# Patient Record
Sex: FEMALE | Race: WHITE | URBAN - METROPOLITAN AREA
[De-identification: names, ages, dates, MRNs, and addresses within clinical notes are randomized per-mention and may not be internally consistent; named-entity substitution may affect disease eponyms.]

---

## 2017-02-20 ENCOUNTER — HISTORICAL (OUTPATIENT)
Dept: ANESTHESIOLOGY | Facility: HOSPITAL | Age: 37
End: 2017-02-20

## 2017-02-24 ENCOUNTER — HISTORICAL (OUTPATIENT)
Dept: SURGERY | Facility: HOSPITAL | Age: 37
End: 2017-02-24

## 2025-06-02 DIAGNOSIS — M25.462 KNEE EFFUSION, LEFT: Primary | ICD-10-CM

## 2025-06-16 ENCOUNTER — OFFICE VISIT (OUTPATIENT)
Dept: ORTHOPEDICS | Facility: CLINIC | Age: 45
End: 2025-06-16
Payer: COMMERCIAL

## 2025-06-16 VITALS
HEIGHT: 68 IN | WEIGHT: 160.38 LBS | BODY MASS INDEX: 24.31 KG/M2 | SYSTOLIC BLOOD PRESSURE: 131 MMHG | HEART RATE: 93 BPM | DIASTOLIC BLOOD PRESSURE: 81 MMHG

## 2025-06-16 DIAGNOSIS — S83.282A TEAR OF LATERAL MENISCUS OF LEFT KNEE, CURRENT, UNSPECIFIED TEAR TYPE, INITIAL ENCOUNTER: ICD-10-CM

## 2025-06-16 DIAGNOSIS — M25.562 LEFT KNEE PAIN, UNSPECIFIED CHRONICITY: Primary | ICD-10-CM

## 2025-06-16 DIAGNOSIS — M25.462 KNEE EFFUSION, LEFT: ICD-10-CM

## 2025-06-16 PROCEDURE — 3075F SYST BP GE 130 - 139MM HG: CPT | Mod: CPTII,,, | Performed by: PHYSICIAN ASSISTANT

## 2025-06-16 PROCEDURE — 1159F MED LIST DOCD IN RCRD: CPT | Mod: CPTII,,, | Performed by: PHYSICIAN ASSISTANT

## 2025-06-16 PROCEDURE — 3079F DIAST BP 80-89 MM HG: CPT | Mod: CPTII,,, | Performed by: PHYSICIAN ASSISTANT

## 2025-06-16 PROCEDURE — 99203 OFFICE O/P NEW LOW 30 MIN: CPT | Mod: ,,, | Performed by: PHYSICIAN ASSISTANT

## 2025-06-16 PROCEDURE — 1160F RVW MEDS BY RX/DR IN RCRD: CPT | Mod: CPTII,,, | Performed by: PHYSICIAN ASSISTANT

## 2025-06-16 PROCEDURE — 3008F BODY MASS INDEX DOCD: CPT | Mod: CPTII,,, | Performed by: PHYSICIAN ASSISTANT

## 2025-06-16 RX ORDER — FENOFIBRATE 54 MG/1
54 TABLET ORAL
COMMUNITY
Start: 2025-04-14

## 2025-06-16 RX ORDER — ESTRADIOL 2 MG/1
2 TABLET ORAL
COMMUNITY
Start: 2025-05-15

## 2025-06-16 NOTE — PROGRESS NOTES
Chief Complaint:   Chief Complaint   Patient presents with    Knee Pain     L knee - ongoing for about 3 mths. Reports clicking/popping behind the knee. Reports intermittent pain. Pcp was rx prednisone it helped. States it was rx mid May 2025. Had an MRI/XR        History of present illness:    This is a 45 y.o. year old   History of Present Illness    CHIEF COMPLAINT:  - Left knee pain and mechanical symptoms    HPI:  Anai presents with left knee pain ongoing for approximately three months. Pain occurs intermittently, ranging from uncomfortable to painful depending on the day. It is particularly noticeable when the leg is either fully extended or fully bent. She reports a clicking or popping sensation in the back of the knee during motion, describing it as abnormal movement. She also reports left knee instability, occurring about 2-3 times per week. Pain is worse when the leg is fully extended backwards, especially around the kneecap.    She denies any specific injury or trauma precipitating the onset of symptoms. Both knees have produced crepitus over the past few years when climbing stairs to her second-floor residence.    Wearing a knee brace helps reduce pain and provides stability. Anai saw her family physician on May 16th and was prescribed a week-long course of prednisone, which provided some relief. Since then, she has been taking ibuprofen as needed for pain management.    She denies any previous injuries requiring crutches, pain in the groin or side of the hip, pain traveling down the leg to the foot, or the knee ever physically getting stuck. She denies any history of circulation problems. Knee brace: Ongoing use, helps lessen pain and prevents sensation of knee locking up or giving out  Ibuprofen: As needed for severe pain, after finishing prednisone course    IMAGING:  - XR Left Knee  - MRI Left Knee: Shows intra-substance degeneration and a possible meniscus tear, potentially at the root. The  "lateral side of the meniscus appears asymmetrical compared to the medial side. The ACL and PCL appear intact.    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    fenofibrate (TRICOR) 54 MG tablet Take 54 mg by mouth.     No current facility-administered medications for this visit.       Review of Systems:    Constitution:   Denies chills, fever, and sweats.  HENT:   Denies headaches or blurry vision.  Cardiovascular:  Denies chest pain or irregular heart beat.  Respiratory:   Denies cough or shortness of breath.  Gastrointestinal:  Denies abdominal pain, nausea, or vomiting.  Musculoskeletal:   Denies muscle cramps.  Neurological:   Denies dizziness or focal weakness.  Psychiatric/Behavior: Normal mental status.  Hematology/Lymph:  Denies bleeding problem or easy bruising/bleeding.  Skin:    Denies rash or suspicious lesions.    Examination:    Vital Signs:    Vitals:    06/16/25 0813   BP: 131/81   Pulse: 93   Weight: 72.8 kg (160 lb 6.4 oz)   Height: 5' 8" (1.727 m)       Body mass index is 24.39 kg/m².    Constitution:   Well-developed, well nourished patient in no acute distress.  Neurological:   Alert and oriented x 3 and cooperative to examination.     Psychiatric/Behavior: Normal mental status.  Respiratory:   No shortness of breath.  Eyes:    Extraoccular muscles intact  Skin:    No scars, rash or suspicious lesions.    Physical Exam:       General Musculoskeletal Exam   Gait: antalgic       Left Knee Exam     Inspection   Erythema: absent  Effusion: present  Deformity: present  Bruising: absent    Tenderness   The patient is tender to palpation of the lateral joint line    Crepitus   The patient has crepitus with ROM    Range of Motion   Extension: abnormal   Flexion: abnormal   0-130    Tests   Meniscus   Kera:  Positive lateral  Ligament " Examination   MCL - Valgus: normal (0 to 2mm)  LCL - Varus: normal  Patella   Passive Patellar Tilt: neutral    Other   Sensation: normal    Comments:  No deformity    Muscle Strength   Right Lower Extremity   Quadriceps:  5/5   Hamstrin/5     Vascular Exam     Right Pulses  Dorsalis Pedis:      2+  Posterior Tibial:      2+      Imaging:  MRI reviewed from outside office  The patient has some changes in the lateral meniscus consistent which could be a tear.    MRI report shows potential meniscal root abnormality as well       Assessment: Left knee pain, unspecified chronicity    Knee effusion, left  -     Ambulatory referral/consult to Orthopedics    Tear of lateral meniscus of left knee, current, unspecified tear type, initial encounter         Plan:    Assessment & Plan    MENISCUS TEAR AND KNEE PAIN:  - Sports medication specialist would determine if meniscus repair or removal is necessary during arthroscopic knee surgery.  - Repair would have a slower recovery but better long-term benefits.  - Anai understands the risks and benefits and elects to proceed with arthroscopic knee surgery.  - Discussed possibility of knee cortisone injection with the patient would elect to have her knee looked at to see if that could be repaired her fixed with surgery    PAIN MANAGEMENT:  - Take ibuprofen for pain as needed.    FOLLOW-UP:  - Referred to sports medication specialist (Dr. Whitten) for further evaluation and potential surgical intervention.                This note was generated with the assistance of ambient listening technology. Verbal consent was obtained by the patient and accompanying visitor(s) for the recording of patient appointment to facilitate this note. I attest to having reviewed and edited the generated note for accuracy, though some syntax or spelling errors may persist. Please contact the author of this note for any clarification.       DISCLAIMER: This note may have been dictated using voice  recognition software and may contain grammatical errors.     NOTE: Consult report sent to referring provider via [employer] EMR.

## 2025-06-30 ENCOUNTER — OFFICE VISIT (OUTPATIENT)
Dept: ORTHOPEDICS | Facility: CLINIC | Age: 45
End: 2025-06-30
Payer: COMMERCIAL

## 2025-06-30 VITALS
HEART RATE: 75 BPM | SYSTOLIC BLOOD PRESSURE: 110 MMHG | WEIGHT: 160 LBS | BODY MASS INDEX: 24.25 KG/M2 | HEIGHT: 68 IN | DIASTOLIC BLOOD PRESSURE: 74 MMHG

## 2025-06-30 DIAGNOSIS — S83.282A TEAR OF LATERAL MENISCUS OF LEFT KNEE, CURRENT, UNSPECIFIED TEAR TYPE, INITIAL ENCOUNTER: Primary | ICD-10-CM

## 2025-06-30 PROCEDURE — 3074F SYST BP LT 130 MM HG: CPT | Mod: CPTII,,, | Performed by: ORTHOPAEDIC SURGERY

## 2025-06-30 PROCEDURE — 20610 DRAIN/INJ JOINT/BURSA W/O US: CPT | Mod: LT,,, | Performed by: NURSE PRACTITIONER

## 2025-06-30 PROCEDURE — 99214 OFFICE O/P EST MOD 30 MIN: CPT | Mod: 25,,, | Performed by: ORTHOPAEDIC SURGERY

## 2025-06-30 PROCEDURE — 3008F BODY MASS INDEX DOCD: CPT | Mod: CPTII,,, | Performed by: ORTHOPAEDIC SURGERY

## 2025-06-30 PROCEDURE — 1159F MED LIST DOCD IN RCRD: CPT | Mod: CPTII,,, | Performed by: ORTHOPAEDIC SURGERY

## 2025-06-30 PROCEDURE — 3078F DIAST BP <80 MM HG: CPT | Mod: CPTII,,, | Performed by: ORTHOPAEDIC SURGERY

## 2025-06-30 RX ORDER — LORATADINE 5 MG/1
TABLET, CHEWABLE ORAL
COMMUNITY

## 2025-06-30 RX ORDER — MULTIVIT-MIN/IRON/FOLIC ACID/K 18-600-40
CAPSULE ORAL
COMMUNITY

## 2025-06-30 RX ORDER — LIDOCAINE HYDROCHLORIDE 20 MG/ML
3 INJECTION, SOLUTION INFILTRATION; PERINEURAL
Status: DISCONTINUED | OUTPATIENT
Start: 2025-06-30 | End: 2025-06-30 | Stop reason: HOSPADM

## 2025-06-30 RX ORDER — VIT C/E/ZN/COPPR/LUTEIN/ZEAXAN 250MG-90MG
1 CAPSULE ORAL
COMMUNITY

## 2025-06-30 RX ORDER — PHENYLEPHRINE HCL 10 MG
TABLET ORAL
COMMUNITY

## 2025-06-30 RX ORDER — BETAMETHASONE SODIUM PHOSPHATE AND BETAMETHASONE ACETATE 3; 3 MG/ML; MG/ML
6 INJECTION, SUSPENSION INTRA-ARTICULAR; INTRALESIONAL; INTRAMUSCULAR; SOFT TISSUE
Status: DISCONTINUED | OUTPATIENT
Start: 2025-06-30 | End: 2025-06-30 | Stop reason: HOSPADM

## 2025-06-30 RX ADMIN — LIDOCAINE HYDROCHLORIDE 3 MG: 20 INJECTION, SOLUTION INFILTRATION; PERINEURAL at 01:06

## 2025-06-30 RX ADMIN — BETAMETHASONE SODIUM PHOSPHATE AND BETAMETHASONE ACETATE 6 MG: 3; 3 INJECTION, SUSPENSION INTRA-ARTICULAR; INTRALESIONAL; INTRAMUSCULAR; SOFT TISSUE at 01:06

## 2025-06-30 NOTE — PROGRESS NOTES
"Chief Complaint:   Chief Complaint   Patient presents with    Left Knee - Pre-op Exam      L knee - MRI in chart - discuss sx, referred by Paras, patient reports the motion at the tear and feels more movement since the MRI, here with her mother (roosevelt), ambulates with a knee sleeve     History of Present Illness    CHIEF COMPLAINT:  Left knee pain    HPI:  Anai presents with left knee pain ongoing for approximately three months. Pain is primarily located behind the knee, with occasional discomfort around the kneecap. No specific injury or incident triggered the pain. Pain occurs when fully extending or bending the knee, with a sensation of potential giving out or locking up at a midpoint of flexion, although this has not actually occurred. She reports unusual movement within the knee.    She has been using a knee brace, which helps support the joint and alleviates some discomfort. She recently completed a course of oral prednisone, which reduced the pain but did not completely resolve the discomfort. An MRI was performed while she was taking prednisone.    She lives on the second floor, which she initially thought might contribute to her knee issues. She denies significant swelling in the knee, describing it as "painful and uncomfortable" rather than notably swollen. She notes that both knees have been "crunchy" for several years.    She denies any specific injury, feeling a pop, or any particular incident that caused the knee pain. Prednisone pack: Recently completed, provided some pain relief but did not completely alleviate the discomfort.    Knee brace: Ongoing use, helps support the knee and reduces discomfort during movement.         Past Medical History:   Diagnosis Date    Anxiety disorder, unspecified     Depression     Hyperlipidemia        Past Surgical History:   Procedure Laterality Date    ABLATION      ADENOIDECTOMY  2000    I believe this was done at the same time as my tonsillectomy, but not 100% " sure    CHOLECYSTECTOMY  2013, I think    Gallbladder was function at 35%    HYSTERECTOMY  November 10th, 2020    Endometriosis found in ovaries    TONSILLECTOMY  2000    WISDOM TOOTH EXTRACTION         Current Outpatient Medications   Medication Sig    ascorbic acid, vitamin C, 500 mg Cap as directed Orally    Bacillus subtilis-inulin 1.5 billion cell-1 gram Chew as directed Orally    cholecalciferol, vitamin D3, (VITAMIN D3) 25 mcg (1,000 unit) capsule 1 capsule.    cinnamon bark 500 mg capsule as directed Orally    estradioL (ESTRACE) 2 MG tablet Take 2 mg by mouth.    fenofibrate (TRICOR) 54 MG tablet Take 54 mg by mouth.    loratadine (CHILDREN'S CLARITIN) 5 mg chewable tablet as directed Orally     No current facility-administered medications for this visit.       Review of patient's allergies indicates:   Allergen Reactions    Azithromycin Nausea And Vomiting    Clarithromycin Nausea And Vomiting       Family History   Problem Relation Name Age of Onset    Arthritis Father Alvarado HDEZ. Mensah     Diabetes Father Alvarado YANES Mensah     Cancer Maternal Grandfather John Brien     Cancer Maternal Grandmother Yoko Brien     Cancer Paternal Grandmother Darcy Mensah     Diabetes Paternal Grandmother Darcy Mensah     Stroke Paternal Grandmother Darcy Mensah     Cancer Sister Flores Kelly        Social History[1]    Review of Systems:    Constitution:   Denies chills, fever, and sweats.  HENT:   Denies headaches or blurry vision.  Cardiovascular:  Denies chest pain or irregular heart beat.  Respiratory:   Denies cough or shortness of breath.  Gastrointestinal:  Denies abdominal pain, nausea, or vomiting.  Musculoskeletal:   Denies muscle cramps.  Neurological:   Denies dizziness or focal weakness.  Psychiatric/Behavior: Normal mental status.  Hematology/Lymph:  Denies bleeding problem or easy bruising/bleeding.  Skin:    Denies rash or suspicious lesions.    Examination:    Vital Signs:    Vitals:     "06/30/25 1318   BP: 110/74   Pulse: 75   Weight: 72.6 kg (160 lb)   Height: 5' 8" (1.727 m)       Body mass index is 24.33 kg/m².    Constitution:   Well-developed, well nourished patient in no acute distress.  Neurological:   Alert and oriented x 3 and cooperative to examination.     Psychiatric/Behavior: Normal mental status.  Respiratory:   No shortness of breath.  Cards:   Pulses palpable, brisk cap refill  Eyes:    Extraoccular muscles intact  Skin:    No scars, rash or suspicious lesions.    MSK:   Standing exam  stance: normal alignment, no significant leg-length discrepancy  gait: antalgic    Knee examination  - General comments: unremarkable appearance    - Tenderness:lateral joint line    Knee                  RIGHT    LEFT  Skin:                  Intact      Intact  ROM:                 0-130      0-130  Effusion:             Neg          +  MJL TTP:           Neg         Neg  LJL TTP:            Neg           +  Kera:         Neg         +  Pat crep:            Neg         Neg  Patella TTPs:     Neg         Neg  Patella grind:      Neg        Neg  Lachman:           Neg        Neg  Pivot shift:          Neg        Neg  Valgus stress:    Neg        Neg  Varus stress:      Neg        Neg  Posterior drawer: Neg       Neg    N-V intact intact  Hip: nml nml    Lower extremity edema:Negative     Imaging: X-rays and MRI were reviewed which shows small anterior lateral meniscus tear.       Assessment: Tear of lateral meniscus of left knee, current, unspecified tear type, initial encounter  -     Large Joint Aspiration/Injection: L knee        Plan:  We will try an injection today.  If her pain recurs we could consider arthroscopic intervention               [1]   Social History  Socioeconomic History    Marital status: Single   Tobacco Use    Smoking status: Former     Current packs/day: 0.00     Average packs/day: 0.1 packs/day for 17.7 years (0.9 ttl pk-yrs)     Types: Cigarettes     Start date: 6/1/1995 "     Quit date: 2013     Years since quittin.3    Smokeless tobacco: Never    Tobacco comments:     I smoked off and on until  and then smoked regularly until I quit.   Substance and Sexual Activity    Alcohol use: Not Currently     Comment: I might indulge in an alcoholic beverage or two a year.    Drug use: Yes     Types: Marijuana     Comment: Medical Marijuana - I smoke approximately a joint a day.    Sexual activity: Not Currently     Partners: Male     Comment: I haven't been sexually active in 13 years.

## 2025-06-30 NOTE — PROCEDURES
Large Joint Aspiration/Injection: L knee    Date/Time: 6/30/2025 1:30 PM    Performed by: Radha Messina FNP  Authorized by: Jasson Whitten Jr., MD    Consent Done?:  Yes (Verbal)  Indications:  Pain  Timeout: prior to procedure the correct patient, procedure, and site was verified    Prep: patient was prepped and draped in usual sterile fashion      Details:  Needle Size:  22 G  Approach:  Anterolateral  Location:  Knee  Site:  L knee  Medications:  6 mg betamethasone acetate-betamethasone sodium phosphate 6 mg/mL; 3 mg LIDOcaine HCL 20 mg/ml (2%) 20 mg/mL (2 %)  Patient tolerance:  Patient tolerated the procedure well with no immediate complications

## 2025-08-04 ENCOUNTER — OFFICE VISIT (OUTPATIENT)
Dept: ORTHOPEDICS | Facility: CLINIC | Age: 45
End: 2025-08-04
Payer: COMMERCIAL

## 2025-08-04 VITALS
DIASTOLIC BLOOD PRESSURE: 71 MMHG | HEART RATE: 65 BPM | SYSTOLIC BLOOD PRESSURE: 108 MMHG | HEIGHT: 68 IN | BODY MASS INDEX: 24.26 KG/M2 | WEIGHT: 160.06 LBS

## 2025-08-04 DIAGNOSIS — S83.282A TEAR OF LATERAL MENISCUS OF LEFT KNEE, CURRENT, UNSPECIFIED TEAR TYPE, INITIAL ENCOUNTER: Primary | ICD-10-CM

## 2025-08-04 PROCEDURE — 3008F BODY MASS INDEX DOCD: CPT | Mod: CPTII,,, | Performed by: ORTHOPAEDIC SURGERY

## 2025-08-04 PROCEDURE — 99213 OFFICE O/P EST LOW 20 MIN: CPT | Mod: ,,, | Performed by: ORTHOPAEDIC SURGERY

## 2025-08-04 PROCEDURE — 1159F MED LIST DOCD IN RCRD: CPT | Mod: CPTII,,, | Performed by: ORTHOPAEDIC SURGERY

## 2025-08-04 PROCEDURE — 3078F DIAST BP <80 MM HG: CPT | Mod: CPTII,,, | Performed by: ORTHOPAEDIC SURGERY

## 2025-08-04 PROCEDURE — 3074F SYST BP LT 130 MM HG: CPT | Mod: CPTII,,, | Performed by: ORTHOPAEDIC SURGERY

## 2025-08-04 NOTE — PROGRESS NOTES
"Chief Complaint:   Chief Complaint   Patient presents with    Left Knee - Follow-up     5 week F/u left knee injection 6/30/25, patient states it took a while but it did, doesn't have to wear the knee brace as often, pain isnt as intense as it was before      History of Present Illness    CHIEF COMPLAINT:  Left knee pain    HPI:  Anai presents with left knee pain ongoing for approximately three months. Pain is primarily located behind the knee, with occasional discomfort around the kneecap. No specific injury or incident triggered the pain. Pain occurs when fully extending or bending the knee, with a sensation of potential giving out or locking up at a midpoint of flexion, although this has not actually occurred. She reports unusual movement within the knee.  She has been using a knee brace, which helps support the joint and alleviates some discomfort. She recently completed a course of oral prednisone, which reduced the pain but did not completely resolve the discomfort. An MRI was performed while she was taking prednisone.  She lives on the second floor, which she initially thought might contribute to her knee issues. She denies significant swelling in the knee, describing it as "painful and uncomfortable" rather than notably swollen. She notes that both knees have been "crunchy" for several years.  She denies any specific injury, feeling a pop, or any particular incident that caused the knee pain. Prednisone pack: Recently completed, provided some pain relief but did not completely alleviate the discomfort.  Knee brace: Ongoing use, helps support the knee and reduces discomfort during movement.     She returns today.  Her pain is improved from the injection.        Past Medical History:   Diagnosis Date    Anxiety disorder, unspecified     Depression     Hyperlipidemia        Past Surgical History:   Procedure Laterality Date    ABLATION      ADENOIDECTOMY  2000    I believe this was done at the same time as my " Date: 3/17/21    Phone #: [phone]  Dietitian Name: Tavo PantojaÁLVARO, CD    Weight: 194   BMI: 34.92      Bariatric Nutrition and Activity Plan (after surgery)    1. Eat 3 small meals per day  2. Drink 48-64 ounces of liquids per day   3. No carbonated beverages  4. Choose foods low in sugar and fat  5. Take vitamins as directed  6. Aim for 60-80 grams of protein per day  7. Eat meals very slowly   8. Be physically active    Personal Goals:  1. Begin the High Protein Liquid Diet on April 8th. 2. Eat 3 small meals per day and aim for 600-800 calories.    -Begin the Pureed Diet once tolerating full liquids and follow for 2 weeks. 3. Drink 48-64 ounces fluids daily, which includes your protein drinks. 4. Drink 1 protein drink between meals 3 times daily and aim for 60-80 grams of protein daily. 5. Take vitamins as directed and send pictures via e-mail for review:    -2 times the adult dose of chewable or liquid multivitamin with minerals and 18 mg of Iron.    -50 mcg (2000 IU) vitamin D3 twice daily    -500-600 mg Calcium Citrate in chewable or liquid form 2-3 times daily at separate times.    -Sublingual B12 - if 500 mcg take every day or if 1000 mcg take every other day. 6. Keep food records for 1-2 weeks after surgery and bring to next visit for review. 7. Bring Bariatric Surgery Diet Guidelines book to next visit. 8. Call Dr. Jalil Gonzalez office at [phone] if you have nausea with vomiting for more than one day. tonsillectomy, but not 100% sure    CHOLECYSTECTOMY  2013, I think    Gallbladder was function at 35%    HYSTERECTOMY  November 10th, 2020    Endometriosis found in ovaries    TONSILLECTOMY  2000    WISDOM TOOTH EXTRACTION         Current Outpatient Medications   Medication Sig    ascorbic acid, vitamin C, 500 mg Cap as directed Orally    Bacillus subtilis-inulin 1.5 billion cell-1 gram Chew as directed Orally    cholecalciferol, vitamin D3, (VITAMIN D3) 25 mcg (1,000 unit) capsule 1 capsule.    cinnamon bark 500 mg capsule as directed Orally    estradioL (ESTRACE) 2 MG tablet Take 2 mg by mouth.    fenofibrate (TRICOR) 54 MG tablet Take 54 mg by mouth.    loratadine (CHILDREN'S CLARITIN) 5 mg chewable tablet as directed Orally     No current facility-administered medications for this visit.       Review of patient's allergies indicates:   Allergen Reactions    Azithromycin Nausea And Vomiting    Clarithromycin Nausea And Vomiting       Family History   Problem Relation Name Age of Onset    Arthritis Father Alvarado HDEZ. Mensah     Diabetes Father Alvarado YANES Mensah     Cancer Maternal Grandfather John Brien     Cancer Maternal Grandmother Yoko Brien     Cancer Paternal Grandmother Darcy Mensah     Diabetes Paternal Grandmother Darcy Mensah     Stroke Paternal Grandmother Darcy Mensah     Cancer Sister Flores Kelly        Social History[1]    Review of Systems:    Constitution:   Denies chills, fever, and sweats.  HENT:   Denies headaches or blurry vision.  Cardiovascular:  Denies chest pain or irregular heart beat.  Respiratory:   Denies cough or shortness of breath.  Gastrointestinal:  Denies abdominal pain, nausea, or vomiting.  Musculoskeletal:   Denies muscle cramps.  Neurological:   Denies dizziness or focal weakness.  Psychiatric/Behavior: Normal mental status.  Hematology/Lymph:  Denies bleeding problem or easy bruising/bleeding.  Skin:    Denies rash or suspicious lesions.    Examination:    Vital  "Signs:    Vitals:    08/04/25 1424   BP: 108/71   Pulse: 65   Weight: 72.6 kg (160 lb 0.9 oz)   Height: 5' 8" (1.727 m)       Body mass index is 24.34 kg/m².    Constitution:   Well-developed, well nourished patient in no acute distress.  Neurological:   Alert and oriented x 3 and cooperative to examination.     Psychiatric/Behavior: Normal mental status.  Respiratory:   No shortness of breath.  Cards:   Pulses palpable, brisk cap refill  Eyes:    Extraoccular muscles intact  Skin:    No scars, rash or suspicious lesions.    MSK:   Standing exam  stance: normal alignment, no significant leg-length discrepancy  gait: antalgic    Knee examination  - General comments: unremarkable appearance    - Tenderness:lateral joint line    Knee                  RIGHT    LEFT  Skin:                  Intact      Intact  ROM:                 0-130      0-130  Effusion:             Neg          +  MJL TTP:           Neg         Neg  LJL TTP:            Neg           +  Kera:         Neg         +  Pat crep:            Neg         Neg  Patella TTPs:     Neg         Neg  Patella grind:      Neg        Neg  Lachman:           Neg        Neg  Pivot shift:          Neg        Neg  Valgus stress:    Neg        Neg  Varus stress:      Neg        Neg  Posterior drawer: Neg       Neg    N-V intact intact  Hip: nml nml    Lower extremity edema:Negative     Imaging: X-rays and MRI were reviewed which shows small anterior lateral meniscus tear.       Assessment: Tear of lateral meniscus of left knee, current, unspecified tear type, initial encounter        Plan:  Doing well status post above.  Activities as tolerated.  If her pain recurs we could consider arthroscopic partial lateral meniscectomy             [1]   Social History  Socioeconomic History    Marital status: Single   Tobacco Use    Smoking status: Former     Current packs/day: 0.00     Average packs/day: 0.1 packs/day for 17.7 years (0.9 ttl pk-yrs)     Types: Cigarettes     " Start date: 1995     Quit date: 2013     Years since quittin.4    Smokeless tobacco: Never    Tobacco comments:     I smoked off and on until  and then smoked regularly until I quit.   Substance and Sexual Activity    Alcohol use: Not Currently     Comment: I might indulge in an alcoholic beverage or two a year.    Drug use: Yes     Types: Marijuana     Comment: Medical Marijuana - I smoke approximately a joint a day.    Sexual activity: Not Currently     Partners: Male     Comment: I haven't been sexually active in 13 years.